# Patient Record
Sex: FEMALE | Race: WHITE | NOT HISPANIC OR LATINO | Employment: FULL TIME | URBAN - NONMETROPOLITAN AREA
[De-identification: names, ages, dates, MRNs, and addresses within clinical notes are randomized per-mention and may not be internally consistent; named-entity substitution may affect disease eponyms.]

---

## 2023-04-21 ENCOUNTER — TELEPHONE (OUTPATIENT)
Dept: PRIMARY CARE | Facility: CLINIC | Age: 58
End: 2023-04-21
Payer: COMMERCIAL

## 2023-04-21 NOTE — TELEPHONE ENCOUNTER
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 She was hired in as a small  (rats, mice, etc) but occasionally a large  will call off and she will be called to the dog room and it is proving too much for her shoulder after having that rotator cuff surgery.     I did call the patient to let you know that you will be back in the office on 4/24

## 2023-06-29 ENCOUNTER — TELEPHONE (OUTPATIENT)
Dept: PRIMARY CARE | Facility: CLINIC | Age: 58
End: 2023-06-29
Payer: COMMERCIAL

## 2023-06-29 NOTE — TELEPHONE ENCOUNTER
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

## 2023-07-25 ENCOUNTER — OFFICE VISIT (OUTPATIENT)
Dept: PRIMARY CARE | Facility: CLINIC | Age: 58
End: 2023-07-25
Payer: COMMERCIAL

## 2023-07-25 VITALS
HEIGHT: 63 IN | BODY MASS INDEX: 27.04 KG/M2 | OXYGEN SATURATION: 97 % | SYSTOLIC BLOOD PRESSURE: 122 MMHG | DIASTOLIC BLOOD PRESSURE: 77 MMHG | WEIGHT: 152.6 LBS | HEART RATE: 89 BPM | TEMPERATURE: 97.3 F

## 2023-07-25 DIAGNOSIS — H00.024 HORDEOLUM INTERNUM OF LEFT UPPER EYELID: ICD-10-CM

## 2023-07-25 DIAGNOSIS — H00.015 HORDEOLUM EXTERNUM OF LEFT LOWER EYELID: Primary | ICD-10-CM

## 2023-07-25 DIAGNOSIS — R73.03 PREDIABETES: ICD-10-CM

## 2023-07-25 DIAGNOSIS — M94.0 ACUTE COSTOCHONDRITIS: ICD-10-CM

## 2023-07-25 DIAGNOSIS — Z00.00 HEALTHCARE MAINTENANCE: ICD-10-CM

## 2023-07-25 PROCEDURE — 1036F TOBACCO NON-USER: CPT | Performed by: PHYSICIAN ASSISTANT

## 2023-07-25 PROCEDURE — 99214 OFFICE O/P EST MOD 30 MIN: CPT | Performed by: PHYSICIAN ASSISTANT

## 2023-07-25 RX ORDER — PREDNISONE 10 MG/1
TABLET ORAL
Qty: 30 TABLET | Refills: 0 | Status: SHIPPED
Start: 2023-07-25 | End: 2023-10-26 | Stop reason: ALTCHOICE

## 2023-07-25 RX ORDER — ERYTHROMYCIN 5 MG/G
OINTMENT OPHTHALMIC 3 TIMES DAILY
Qty: 3.5 G | Refills: 0 | Status: SHIPPED
Start: 2023-07-25 | End: 2023-07-25 | Stop reason: ENTERED-IN-ERROR

## 2023-07-25 RX ORDER — DOXYCYCLINE 100 MG/1
100 TABLET ORAL 2 TIMES DAILY
Qty: 14 TABLET | Refills: 0 | Status: SHIPPED
Start: 2023-07-25 | End: 2023-07-25 | Stop reason: ENTERED-IN-ERROR

## 2023-07-25 ASSESSMENT — PATIENT HEALTH QUESTIONNAIRE - PHQ9
1. LITTLE INTEREST OR PLEASURE IN DOING THINGS: NOT AT ALL
2. FEELING DOWN, DEPRESSED OR HOPELESS: NOT AT ALL
SUM OF ALL RESPONSES TO PHQ9 QUESTIONS 1 AND 2: 0

## 2023-07-25 NOTE — PROGRESS NOTES
"Subjective   Patient ID: Rose Singh is a 57 y.o. female who presents for Follow-up ( ER [facility] in [place], patient seen early Monday morning chest pain.  Patient states CT showed lymph nodes in the chest./Patient taking prescription Ibuprofen with mild chest discomfort.).  HPI  Patient presents in ER follow-up for chest pain.  Patient experienced sternal chest pain over the past weekend and was seen in the ER yesterday.  Fairly extensive cardiac work-up including CT chest was unremarkable for acute process.  Patient was treated with ibuprofen which has improved the symptoms.  Patient denies any known precipitating event prior to onset.  Pain is exacerbated by and alleviated by change in position.  Review of Systems    Constitutional:  See HPI   Respiratory:  No shortness of breath, No cough.    Cardiovascular: See HPI  Neurologic:  Alert and oriented X4, No numbness, No tingling.    All other systems are negative     Objective     /77   Pulse 89   Temp 36.3 °C (97.3 °F) (Temporal)   Ht 1.6 m (5' 3\")   Wt 69.2 kg (152 lb 9.6 oz)   SpO2 97%   BMI 27.03 kg/m²     Physical Exam    General:  Alert and oriented, No acute distress.    Eye:  Pupils are equal, round and reactive to light, Normal conjunctiva.    HENT:  Normocephalic,   Neck:  Supple    Respiratory: Respirations are non-labored   Musculoskeletal: Normal ROM and strength; sternum is tender to palpation at the costochondral sternal junction  Integumentary:  Warm, Dry, Intact, No pallor, No rash.    Neurologic:  Alert, Oriented, Normal sensory, Cranial Nerves II-XII are grossly intact  Psychiatric:  Cooperative, Appropriate mood & affect.    Assessment/Plan   Costochondritis: Prednisone taper.    Healthcare maintenance screening lab ordered to be obtained 3 months from now prior to follow-up visit.  Follow-up at that time.  Problem List Items Addressed This Visit    None  Visit Diagnoses       Acute costochondritis        Relevant " Medications    predniSONE (Deltasone) 10 mg tablet    Healthcare maintenance        Relevant Medications    predniSONE (Deltasone) 10 mg tablet    Other Relevant Orders    Basic Metabolic Panel    CBC    Hemoglobin A1C    Lipid Panel    Hepatic Function Panel    TSH with reflex to Free T4 if abnormal    Prediabetes        Relevant Medications    predniSONE (Deltasone) 10 mg tablet    Other Relevant Orders    Basic Metabolic Panel    CBC    Hemoglobin A1C    Lipid Panel    Hepatic Function Panel    TSH with reflex to Free T4 if abnormal            Final diagnoses:   [M94.0] Acute costochondritis   [Z00.00] Healthcare maintenance   [R73.03] Prediabetes

## 2023-10-06 ENCOUNTER — LAB (OUTPATIENT)
Dept: LAB | Facility: LAB | Age: 58
End: 2023-10-06
Payer: COMMERCIAL

## 2023-10-06 DIAGNOSIS — Z00.00 HEALTHCARE MAINTENANCE: ICD-10-CM

## 2023-10-06 DIAGNOSIS — R73.03 PREDIABETES: ICD-10-CM

## 2023-10-06 LAB
ALBUMIN SERPL BCP-MCNC: 4.4 G/DL (ref 3.4–5)
ALP SERPL-CCNC: 91 U/L (ref 33–110)
ALT SERPL W P-5'-P-CCNC: 20 U/L (ref 7–45)
ANION GAP SERPL CALC-SCNC: 11 MMOL/L (ref 10–20)
AST SERPL W P-5'-P-CCNC: 18 U/L (ref 9–39)
BILIRUB DIRECT SERPL-MCNC: 0.2 MG/DL (ref 0–0.3)
BILIRUB SERPL-MCNC: 0.7 MG/DL (ref 0–1.2)
BUN SERPL-MCNC: 12 MG/DL (ref 6–23)
CALCIUM SERPL-MCNC: 9.3 MG/DL (ref 8.6–10.3)
CHLORIDE SERPL-SCNC: 105 MMOL/L (ref 98–107)
CHOLEST SERPL-MCNC: 224 MG/DL (ref 0–199)
CHOLESTEROL/HDL RATIO: 3.6
CO2 SERPL-SCNC: 27 MMOL/L (ref 21–32)
CREAT SERPL-MCNC: 0.76 MG/DL (ref 0.5–1.05)
ERYTHROCYTE [DISTWIDTH] IN BLOOD BY AUTOMATED COUNT: 12.6 % (ref 11.5–14.5)
EST. AVERAGE GLUCOSE BLD GHB EST-MCNC: 111 MG/DL
GFR SERPL CREATININE-BSD FRML MDRD: >90 ML/MIN/1.73M*2
GLUCOSE SERPL-MCNC: 96 MG/DL (ref 74–99)
HBA1C MFR BLD: 5.5 %
HCT VFR BLD AUTO: 42.9 % (ref 36–46)
HDLC SERPL-MCNC: 62 MG/DL
HGB BLD-MCNC: 13.4 G/DL (ref 12–16)
LDLC SERPL CALC-MCNC: 148 MG/DL (ref 140–190)
MCH RBC QN AUTO: 29.9 PG (ref 26–34)
MCHC RBC AUTO-ENTMCNC: 31.2 G/DL (ref 32–36)
MCV RBC AUTO: 96 FL (ref 80–100)
NON HDL CHOLESTEROL: 162 MG/DL (ref 0–149)
NRBC BLD-RTO: 0 /100 WBCS (ref 0–0)
PLATELET # BLD AUTO: 265 X10*3/UL (ref 150–450)
PMV BLD AUTO: 10.6 FL (ref 7.5–11.5)
POTASSIUM SERPL-SCNC: 4.1 MMOL/L (ref 3.5–5.3)
PROT SERPL-MCNC: 6.8 G/DL (ref 6.4–8.2)
RBC # BLD AUTO: 4.48 X10*6/UL (ref 4–5.2)
SODIUM SERPL-SCNC: 139 MMOL/L (ref 136–145)
TRIGL SERPL-MCNC: 69 MG/DL (ref 0–149)
TSH SERPL-ACNC: 3.21 MIU/L (ref 0.44–3.98)
VLDL: 14 MG/DL (ref 0–40)
WBC # BLD AUTO: 5.5 X10*3/UL (ref 4.4–11.3)

## 2023-10-06 PROCEDURE — 83036 HEMOGLOBIN GLYCOSYLATED A1C: CPT

## 2023-10-06 PROCEDURE — 36415 COLL VENOUS BLD VENIPUNCTURE: CPT

## 2023-10-06 PROCEDURE — 80061 LIPID PANEL: CPT

## 2023-10-06 PROCEDURE — 84443 ASSAY THYROID STIM HORMONE: CPT

## 2023-10-06 PROCEDURE — 82248 BILIRUBIN DIRECT: CPT

## 2023-10-06 PROCEDURE — 80053 COMPREHEN METABOLIC PANEL: CPT

## 2023-10-06 PROCEDURE — 85027 COMPLETE CBC AUTOMATED: CPT

## 2023-10-26 ENCOUNTER — OFFICE VISIT (OUTPATIENT)
Dept: PRIMARY CARE | Facility: CLINIC | Age: 58
End: 2023-10-26
Payer: COMMERCIAL

## 2023-10-26 VITALS
BODY MASS INDEX: 28.77 KG/M2 | HEART RATE: 71 BPM | SYSTOLIC BLOOD PRESSURE: 107 MMHG | DIASTOLIC BLOOD PRESSURE: 70 MMHG | WEIGHT: 162.4 LBS | HEIGHT: 63 IN

## 2023-10-26 DIAGNOSIS — R73.03 PREDIABETES: ICD-10-CM

## 2023-10-26 DIAGNOSIS — Z00.00 HEALTHCARE MAINTENANCE: ICD-10-CM

## 2023-10-26 DIAGNOSIS — Z00.00 ENCOUNTER FOR WELLNESS EXAMINATION IN ADULT: Primary | ICD-10-CM

## 2023-10-26 DIAGNOSIS — E78.5 DYSLIPIDEMIA: ICD-10-CM

## 2023-10-26 PROCEDURE — 1036F TOBACCO NON-USER: CPT | Performed by: PHYSICIAN ASSISTANT

## 2023-10-26 PROCEDURE — 99396 PREV VISIT EST AGE 40-64: CPT | Performed by: PHYSICIAN ASSISTANT

## 2023-10-26 ASSESSMENT — PATIENT HEALTH QUESTIONNAIRE - PHQ9
SUM OF ALL RESPONSES TO PHQ9 QUESTIONS 1 AND 2: 0
2. FEELING DOWN, DEPRESSED OR HOPELESS: NOT AT ALL
1. LITTLE INTEREST OR PLEASURE IN DOING THINGS: NOT AT ALL

## 2023-10-26 NOTE — PROGRESS NOTES
"Subjective   Patient ID: Rose Singh is a 57 y.o. female who presents for Annual Exam (FU 1 year, right shoulder continues with discomfort with ROM , hx of fall injury x 2 years ago./Mammogram done 10- and has never had a colonoscopy./Pap test done 6  years ago.).  HPI    Patient presents for annual wellness check.  Patient had labs prior to today's visit which showed borderline cholesterol readings and resolution of prediabetes.  Otherwise were unremarkable.  Patient has no acute complaints.    Review of Systems    Constitutional:  See HPI   Eye:  No recent visual problem.    Respiratory:  No shortness of breath, No cough.    Cardiovascular:  No chest pain.    Gastrointestinal:  No abdominal pain, No nausea, No vomiting.    Genitourinary:  No dysuria, No hematuria.    Musculoskeletal:  No decreased range of motion.    Integumentary:  No rash.    Neurologic:  Alert and oriented X4, No numbness, No tingling.    All other systems are negative     Objective     /70   Pulse 71   Ht 1.6 m (5' 3\")   Wt 73.7 kg (162 lb 6.4 oz)   BMI 28.77 kg/m²     Physical Exam    General:  Alert and oriented, No acute distress.    Eye:  Pupils are equal, round and reactive to light, Extraocular movements are intact, Normal conjunctiva.    HENT:  Normocephalic, Normal hearing, Oral mucosa is moist, No pharyngeal erythema, No sinus tenderness.    Neck:  Supple, Non-tender, No lymphadenopathy.    Respiratory:  Lungs are clear to auscultation, Respirations are non-labored, Breath sounds are equal    Cardiovascular:  Normal rate, Regular rhythm.    Gastrointestinal:  Non-distended.    Musculoskeletal: Normal range of motion, Normal strength, No tenderness, No swelling, No deformity, Normal gait.     Integumentary:  Warm, Dry, Intact, No pallor, No rash.    Neurologic:  Alert, Oriented, Normal sensory, Normal motor function, No focal deficits, Cranial Nerves II-XII are grossly intact   Psychiatric:  Cooperative, Appropriate " mood & affect.      Assessment/Plan   Wellness check: Exam is unremarkable.  Labs reviewed.  Follow-up 1 year with labs shortly before.  Patient declined mammogram.  Problem List Items Addressed This Visit    None  Visit Diagnoses       Encounter for wellness examination in adult    -  Primary    Relevant Orders    CBC    Comprehensive Metabolic Panel    TSH with reflex to Free T4 if abnormal    Lipid Panel    Hemoglobin A1C    Healthcare maintenance        Relevant Orders    CBC    Comprehensive Metabolic Panel    TSH with reflex to Free T4 if abnormal    Lipid Panel    Hemoglobin A1C    Prediabetes        Relevant Orders    CBC    Comprehensive Metabolic Panel    TSH with reflex to Free T4 if abnormal    Lipid Panel    Hemoglobin A1C    Dyslipidemia        Relevant Orders    CBC    Comprehensive Metabolic Panel    TSH with reflex to Free T4 if abnormal    Lipid Panel    Hemoglobin A1C            Final diagnoses:   [Z00.00] Encounter for wellness examination in adult   [Z00.00] Healthcare maintenance   [R73.03] Prediabetes   [E78.5] Dyslipidemia